# Patient Record
Sex: MALE | Race: WHITE | NOT HISPANIC OR LATINO | Employment: OTHER | URBAN - METROPOLITAN AREA
[De-identification: names, ages, dates, MRNs, and addresses within clinical notes are randomized per-mention and may not be internally consistent; named-entity substitution may affect disease eponyms.]

---

## 2017-02-08 ENCOUNTER — TELEPHONE (OUTPATIENT)
Dept: FAMILY MEDICINE | Facility: OTHER | Age: 62
End: 2017-02-08

## 2017-02-08 NOTE — TELEPHONE ENCOUNTER
Panel Management Review      Patient has the following on his problem list: None      Composite cancer screening  Chart review shows that this patient is due/due soon for the following Fecal Colorectal (FIT)  Summary:    Patient is due/failing the following:   Lipids, CMP, CBC, Uric acid, Hep C    Action needed:   Patient needs fasting lab only appointment    Type of outreach:    Phone, left message for patient to call back.  Please assist in scheduling for a fasting lab appointment only.    Questions for provider review:    None                                                                                                                                    Naina Honeycutt CMA       Chart routed to Care Team .

## 2017-02-15 NOTE — TELEPHONE ENCOUNTER
I spoke with patient and was informed that he has transferred care to [facility] instead because of an insurance change. as of 2/15/17.  Naina Honeycutt, CMA

## 2019-01-16 ENCOUNTER — TRANSFERRED RECORDS (OUTPATIENT)
Dept: HEALTH INFORMATION MANAGEMENT | Facility: CLINIC | Age: 64
End: 2019-01-16

## 2019-01-18 ENCOUNTER — TELEPHONE (OUTPATIENT)
Dept: FAMILY MEDICINE | Facility: OTHER | Age: 64
End: 2019-01-18

## 2019-01-18 NOTE — TELEPHONE ENCOUNTER
Reason for call:  Patient reporting a symptom    Symptom or request: back pain     Duration (how long have symptoms been present): seen two days ago    Have you been treated for this before? Yes    Additional comments: Patient was seen at [facility] two days ago and told to follow-up with Dr Delong but patient is wondering what he should do, he is unable to get up to come in.     Phone Number patient can be reached at:  Other phone number:  Spouse Nancie [phone]    Best Time:  asap    Can we leave a detailed message on this number:  YES    Call taken on 1/18/2019 at 8:32 AM by Yusra Tavares

## 2019-01-18 NOTE — TELEPHONE ENCOUNTER
Jaswant Clark is a 63 year old male-     NURSING ASSESSMENT:  Description:  Pt was seen in the ER on 1/16/2019 for lower back pain. Pt was advised to follow up in clinic in 2-3 days, however he calls today in severe pain and states that he is unable to get up and walk. Pt rates his pain a 11/10 when he tries to move around. Pt has been taking his Norco as prescribed as well as the Flexeril with some improvement in pain, but it doesn't last.   Onset/duration:  2 days   Precip. factors:  Unknown   Associated symptoms:  Lower back pain with some tingling in the lower leg.   Improves/worsens symptoms:  Not improving   Pain scale (0-10)   10/10  Last exam/Treatment:  7/5/2016  Allergies:   Allergies   Allergen Reactions     No Known Drug Allergies        MEDICATIONS:   Taking medication(s) as prescribed? Yes  Taking over the counter medication(s?) Yes  Any medication side effects? No significant side effects    Any barriers to taking medication(s) as prescribed?  No  Medication(s) improving/managing symptoms?  No  Medication reconciliation completed: No      NURSING PLAN: Nursing advice to patient see in the ER for treatment of severe pain    RECOMMENDED DISPOSITION:  To ED, another person to drive - Pt's wife is home with him. He will try to get in the car and have her bring him. If unable to walk, advised pt to call an ambulance.   Will comply with recommendation: Yes  If further questions/concerns or if symptoms do not improve, worsen or new symptoms develop, call your PCP or [facility] as soon as possible.      Guideline used: Back Pain   Telephone Triage Protocols for Nurses, Fifth Edition, Mere Johnson RN

## 2022-01-31 ENCOUNTER — APPOINTMENT (OUTPATIENT)
Dept: CT IMAGING | Facility: CLINIC | Age: 67
End: 2022-01-31
Attending: EMERGENCY MEDICINE
Payer: MEDICARE

## 2022-01-31 ENCOUNTER — HOSPITAL ENCOUNTER (EMERGENCY)
Facility: CLINIC | Age: 67
Discharge: HOME | End: 2022-01-31
Attending: EMERGENCY MEDICINE | Admitting: EMERGENCY MEDICINE
Payer: MEDICARE

## 2022-01-31 VITALS
TEMPERATURE: 99.7 F | WEIGHT: 203.6 LBS | DIASTOLIC BLOOD PRESSURE: 72 MMHG | OXYGEN SATURATION: 95 % | BODY MASS INDEX: 31.4 KG/M2 | HEART RATE: 99 BPM | SYSTOLIC BLOOD PRESSURE: 122 MMHG | RESPIRATION RATE: 20 BRPM

## 2022-01-31 DIAGNOSIS — J18.9 PNEUMONIA OF LEFT UPPER LOBE DUE TO INFECTIOUS ORGANISM: ICD-10-CM

## 2022-01-31 LAB
ANION GAP SERPL CALCULATED.3IONS-SCNC: 6 MMOL/L (ref 3–14)
BASOPHILS # BLD AUTO: 0 10E3/UL (ref 0–0.2)
BASOPHILS NFR BLD AUTO: 0 %
BUN SERPL-MCNC: 23 MG/DL (ref 7–30)
CALCIUM SERPL-MCNC: 8.2 MG/DL (ref 8.5–10.1)
CHLORIDE BLD-SCNC: 98 MMOL/L (ref 94–109)
CO2 SERPL-SCNC: 24 MMOL/L (ref 20–32)
CREAT SERPL-MCNC: 1.13 MG/DL (ref 0.66–1.25)
CRP SERPL-MCNC: 119 MG/L (ref 0–8)
D DIMER PPP FEU-MCNC: 4.31 UG/ML FEU (ref 0–0.5)
EOSINOPHIL # BLD AUTO: 0 10E3/UL (ref 0–0.7)
EOSINOPHIL NFR BLD AUTO: 0 %
ERYTHROCYTE [DISTWIDTH] IN BLOOD BY AUTOMATED COUNT: 13.2 % (ref 10–15)
FLUAV RNA SPEC QL NAA+PROBE: NEGATIVE
FLUBV RNA RESP QL NAA+PROBE: NEGATIVE
GFR SERPL CREATININE-BSD FRML MDRD: 71 ML/MIN/1.73M2
GLUCOSE BLD-MCNC: 135 MG/DL (ref 70–99)
HCT VFR BLD AUTO: 35.8 % (ref 40–53)
HGB BLD-MCNC: 12.5 G/DL (ref 13.3–17.7)
IMM GRANULOCYTES # BLD: 0.2 10E3/UL
IMM GRANULOCYTES NFR BLD: 1 %
LACTATE SERPL-SCNC: 1.4 MMOL/L (ref 0.7–2)
LYMPHOCYTES # BLD AUTO: 0.7 10E3/UL (ref 0.8–5.3)
LYMPHOCYTES NFR BLD AUTO: 4 %
MCH RBC QN AUTO: 30 PG (ref 26.5–33)
MCHC RBC AUTO-ENTMCNC: 34.9 G/DL (ref 31.5–36.5)
MCV RBC AUTO: 86 FL (ref 78–100)
MONOCYTES # BLD AUTO: 1.1 10E3/UL (ref 0–1.3)
MONOCYTES NFR BLD AUTO: 7 %
NEUTROPHILS # BLD AUTO: 13.8 10E3/UL (ref 1.6–8.3)
NEUTROPHILS NFR BLD AUTO: 88 %
NRBC # BLD AUTO: 0 10E3/UL
NRBC BLD AUTO-RTO: 0 /100
PLATELET # BLD AUTO: 93 10E3/UL (ref 150–450)
POTASSIUM BLD-SCNC: 3.5 MMOL/L (ref 3.4–5.3)
RBC # BLD AUTO: 4.16 10E6/UL (ref 4.4–5.9)
SARS-COV-2 RNA RESP QL NAA+PROBE: NEGATIVE
SODIUM SERPL-SCNC: 128 MMOL/L (ref 133–144)
TROPONIN I SERPL HS-MCNC: 14 NG/L
WBC # BLD AUTO: 15.7 10E3/UL (ref 4–11)

## 2022-01-31 PROCEDURE — 71275 CT ANGIOGRAPHY CHEST: CPT

## 2022-01-31 PROCEDURE — 99285 EMERGENCY DEPT VISIT HI MDM: CPT | Mod: 25 | Performed by: EMERGENCY MEDICINE

## 2022-01-31 PROCEDURE — 82310 ASSAY OF CALCIUM: CPT | Performed by: EMERGENCY MEDICINE

## 2022-01-31 PROCEDURE — 250N000011 HC RX IP 250 OP 636: Performed by: EMERGENCY MEDICINE

## 2022-01-31 PROCEDURE — 96374 THER/PROPH/DIAG INJ IV PUSH: CPT | Mod: 59 | Performed by: EMERGENCY MEDICINE

## 2022-01-31 PROCEDURE — 258N000003 HC RX IP 258 OP 636: Performed by: EMERGENCY MEDICINE

## 2022-01-31 PROCEDURE — 85379 FIBRIN DEGRADATION QUANT: CPT | Performed by: EMERGENCY MEDICINE

## 2022-01-31 PROCEDURE — 93010 ELECTROCARDIOGRAM REPORT: CPT | Performed by: EMERGENCY MEDICINE

## 2022-01-31 PROCEDURE — C9803 HOPD COVID-19 SPEC COLLECT: HCPCS | Performed by: EMERGENCY MEDICINE

## 2022-01-31 PROCEDURE — 85025 COMPLETE CBC W/AUTO DIFF WBC: CPT | Performed by: EMERGENCY MEDICINE

## 2022-01-31 PROCEDURE — 250N000009 HC RX 250: Performed by: EMERGENCY MEDICINE

## 2022-01-31 PROCEDURE — 96361 HYDRATE IV INFUSION ADD-ON: CPT | Performed by: EMERGENCY MEDICINE

## 2022-01-31 PROCEDURE — 84484 ASSAY OF TROPONIN QUANT: CPT | Performed by: EMERGENCY MEDICINE

## 2022-01-31 PROCEDURE — 93005 ELECTROCARDIOGRAM TRACING: CPT | Performed by: EMERGENCY MEDICINE

## 2022-01-31 PROCEDURE — 83605 ASSAY OF LACTIC ACID: CPT | Performed by: EMERGENCY MEDICINE

## 2022-01-31 PROCEDURE — 36415 COLL VENOUS BLD VENIPUNCTURE: CPT | Performed by: EMERGENCY MEDICINE

## 2022-01-31 PROCEDURE — 87636 SARSCOV2 & INF A&B AMP PRB: CPT | Performed by: EMERGENCY MEDICINE

## 2022-01-31 PROCEDURE — 87040 BLOOD CULTURE FOR BACTERIA: CPT | Performed by: EMERGENCY MEDICINE

## 2022-01-31 PROCEDURE — 86140 C-REACTIVE PROTEIN: CPT | Performed by: EMERGENCY MEDICINE

## 2022-01-31 RX ORDER — ONDANSETRON 2 MG/ML
4 INJECTION INTRAMUSCULAR; INTRAVENOUS EVERY 30 MIN PRN
Status: DISCONTINUED | OUTPATIENT
Start: 2022-01-31 | End: 2022-01-31 | Stop reason: HOSPADM

## 2022-01-31 RX ORDER — AZITHROMYCIN 250 MG/1
TABLET, FILM COATED ORAL
Qty: 6 TABLET | Refills: 0 | Status: SHIPPED | OUTPATIENT
Start: 2022-01-31 | End: 2022-02-05

## 2022-01-31 RX ORDER — BENZONATATE 200 MG/1
200 CAPSULE ORAL 3 TIMES DAILY PRN
Qty: 20 CAPSULE | Refills: 0 | Status: SHIPPED | OUTPATIENT
Start: 2022-01-31

## 2022-01-31 RX ORDER — SODIUM CHLORIDE, SODIUM LACTATE, POTASSIUM CHLORIDE, CALCIUM CHLORIDE 600; 310; 30; 20 MG/100ML; MG/100ML; MG/100ML; MG/100ML
125 INJECTION, SOLUTION INTRAVENOUS CONTINUOUS
Status: DISCONTINUED | OUTPATIENT
Start: 2022-01-31 | End: 2022-01-31 | Stop reason: HOSPADM

## 2022-01-31 RX ORDER — KETOROLAC TROMETHAMINE 15 MG/ML
15 INJECTION, SOLUTION INTRAMUSCULAR; INTRAVENOUS ONCE
Status: COMPLETED | OUTPATIENT
Start: 2022-01-31 | End: 2022-01-31

## 2022-01-31 RX ORDER — IOPAMIDOL 755 MG/ML
500 INJECTION, SOLUTION INTRAVASCULAR ONCE
Status: COMPLETED | OUTPATIENT
Start: 2022-01-31 | End: 2022-01-31

## 2022-01-31 RX ORDER — PANTOPRAZOLE SODIUM 40 MG/1
80 TABLET, DELAYED RELEASE ORAL DAILY
COMMUNITY
Start: 2022-01-13

## 2022-01-31 RX ORDER — LEVOFLOXACIN 500 MG/1
500 TABLET, FILM COATED ORAL DAILY
Qty: 7 TABLET | Refills: 0 | Status: SHIPPED | OUTPATIENT
Start: 2022-01-31 | End: 2022-02-07

## 2022-01-31 RX ADMIN — IOPAMIDOL 75 ML: 755 INJECTION, SOLUTION INTRAVENOUS at 12:37

## 2022-01-31 RX ADMIN — KETOROLAC TROMETHAMINE 15 MG: 15 INJECTION, SOLUTION INTRAMUSCULAR; INTRAVENOUS at 12:08

## 2022-01-31 RX ADMIN — SODIUM CHLORIDE 70 ML: 9 INJECTION, SOLUTION INTRAVENOUS at 12:37

## 2022-01-31 RX ADMIN — SODIUM CHLORIDE, POTASSIUM CHLORIDE, SODIUM LACTATE AND CALCIUM CHLORIDE 1000 ML: 600; 310; 30; 20 INJECTION, SOLUTION INTRAVENOUS at 12:13

## 2022-01-31 NOTE — DISCHARGE INSTRUCTIONS
Your Covid and influenza swabs were negative.  Your lab work showed an elevated white blood cell count which indicates bacterial infection, and CT scan showed left-sided pneumonia    Prescriptions for 2 different antibiotics were sent to your pharmacy.  You should start taking them both today.  Levaquin is taken once daily for 7 days.  Azithromycin is taken 2 tabs on the first day then 1 tab daily for the remaining 4 days, total of 5 days of azithromycin    Prescription for Tessalon Perles was sent to the pharmacy to help with your cough.  Can take up to 3 times daily as needed    Should drink plenty of fluids and get plenty of rest.  You can take Tylenol or ibuprofen per bottle instructions as needed for aches, pains, or fever    Follow-up with your primary provider within 1 week to make sure that everything is improving.  If you have any new or worsening symptoms, worsening chest pain or shortness of breath, are not getting better after 48 hours of antibiotics, or have any new concerns return promptly to the emergency room for evaluation

## 2022-01-31 NOTE — Clinical Note
Jaswant Clark was seen and treated in our emergency department on 1/31/2022.    COVID-19 and influenza testing negative     Sincerely,     [facility] Emergency Dept

## 2022-01-31 NOTE — ED PROVIDER NOTES
History     Chief Complaint   Patient presents with     Covid Concern     HPI  Jaswant Clark is a 66 year old male who presents to the emergency room with fever, cough, and shortness of breath.  Symptoms started 4 days ago.  Cough is productive of purulent sputum, no blood.  Has been running a fever up to 105  F at home.  Noted that he had pain on the left side of his chest and abdomen especially if he laid on that side.  Has not had any swelling in his legs.  No anterior chest pain, nausea, vomiting.  No known sick contacts.  He did receive Covid vaccination but has not received a flu vaccine this season.    Allergies:  Allergies   Allergen Reactions     No Known Drug Allergies        Problem List:    Patient Active Problem List    Diagnosis Date Noted     Health Care Home 09/27/2011     Priority: High     x  DX V65.8 REPLACED WITH 32927 HEALTH CARE HOME (04/08/2013)       Erectile dysfunction, unspecified erectile dysfunction type 12/21/2015     Priority: Medium     Cellulitis, wound, post-operative 04/04/2015     Priority: Medium     Postconcussion syndrome 10/28/2011     Priority: Medium     Advanced directives, counseling/discussion 10/03/2011     Priority: Medium     Health care directive given to patient. Recommended that he makes a copy for his medical record.        Hyperlipidemia LDL goal <160 10/31/2010     Priority: Medium     Family history of other cardiovascular diseases 12/29/2005     Priority: Medium     Problem list name updated by automated process. Provider to review and confirm  Problem list name updated by automated process. Provider to review       Esophageal reflux 02/03/2005     Priority: Medium     Pena's esophagus 02/03/2005     Priority: Medium     Other and unspecified alcohol dependence, unspecified drinking behavior 02/03/2005     Priority: Medium     Gout 02/03/2005     Priority: Medium     Problem list name updated by automated process. Provider to review       Hemorrhage of  gastrointestinal tract 02/03/2005     Priority: Medium     Problem list name updated by automated process. Provider to review          Past Medical History:    Past Medical History:   Diagnosis Date     Pena's esophagus      Esophageal reflux      Gout, unspecified      Hemorrhage of gastrointestinal tract, unspecified 1/10/2005       Past Surgical History:    Past Surgical History:   Procedure Laterality Date      UGI ENDOSCOPY DIAG W BIOPSY  01/11/2005     NO HISTORY OF SURGERY       ORTHOPEDIC SURGERY      on hand       Family History:    Family History   Problem Relation Age of Onset     Cancer Mother      C.A.D. Brother         MI in mid 50's     Cardiovascular Brother         heart attack     Heart Disease Brother         heart attack     Breast Cancer Sister      Cancer Sister         breast cancer     Breast Cancer Sister      Cancer Sister         breast caner     Breast Cancer Sister      Cancer Sister         breast cancer     Asthma No family hx of      Diabetes No family hx of      Hypertension No family hx of      Cerebrovascular Disease No family hx of      Cancer - colorectal No family hx of      Prostate Cancer No family hx of      Alzheimer Disease No family hx of      Arthritis No family hx of      Blood Disease No family hx of      Circulatory No family hx of      Eye Disorder No family hx of      Gastrointestinal Disease No family hx of      Genitourinary Problems No family hx of      Lipids No family hx of      Musculoskeletal Disorder No family hx of      Neurologic Disorder No family hx of      Respiratory No family hx of      Thyroid Disease No family hx of        Social History:  Marital Status:  Single [1]  Social History     Tobacco Use     Smoking status: Never Smoker     Smokeless tobacco: Former User   Substance Use Topics     Alcohol use: Not Currently     Comment: 3 beers per night and more on weekends     Drug use: No        Medications:    allopurinol (ZYLOPRIM) 300 MG  tablet  azithromycin (ZITHROMAX) 250 MG tablet  benzonatate (TESSALON) 200 MG capsule  HYDROcodone-acetaminophen (NORCO) 5-325 MG per tablet  levofloxacin (LEVAQUIN) 500 MG tablet  pantoprazole (PROTONIX) 40 MG EC tablet  simvastatin (ZOCOR) 20 MG tablet  terazosin (HYTRIN) 1 MG capsule          Review of Systems   All other systems reviewed and are negative.      Physical Exam   BP: 137/70  Pulse: (!) 123  Temp: 99.7  F (37.6  C)  Resp: 18  Weight: 92.4 kg (203 lb 9.6 oz)  SpO2: 96 %      Physical Exam  Vitals and nursing note reviewed.   Constitutional:       General: He is not in acute distress.     Appearance: He is not diaphoretic.   HENT:      Head: Atraumatic.   Eyes:      General: No scleral icterus.     Pupils: Pupils are equal, round, and reactive to light.   Cardiovascular:      Rate and Rhythm: Tachycardia present.      Heart sounds: Normal heart sounds.   Pulmonary:      Effort: Pulmonary effort is normal. No respiratory distress.      Breath sounds: Decreased air movement present. No wheezing.   Chest:      Chest wall: No tenderness.   Abdominal:      General: Bowel sounds are normal.      Palpations: Abdomen is soft.      Tenderness: There is no abdominal tenderness.   Musculoskeletal:         General: No tenderness.      Right lower leg: No edema.      Left lower leg: No edema.   Skin:     General: Skin is warm.      Findings: No rash.   Neurological:      Mental Status: He is alert.         ED Course                 Procedures              EKG Interpretation:      Interpreted by Carrol Gómez DO  Time reviewed: 1105  Symptoms at time of EKG: Cough, shortness of breath  Rhythm: sinus tachycardia  Rate: Tachycardia and 116 bpm  Axis: Normal  Ectopy: none  Conduction: normal  ST Segments/ T Waves: No acute ischemic changes  Q Waves: none  Comparison to prior: No old EKG available    Clinical Impression: sinus tachycardia                Critical Care time:  none               Results for orders  placed or performed during the hospital encounter of 01/31/22 (from the past 24 hour(s))   CBC with platelets differential    Narrative    The following orders were created for panel order CBC with platelets differential.  Procedure                               Abnormality         Status                     ---------                               -----------         ------                     CBC with platelets and d...[[id number]]  Abnormal            Final result                 Please view results for these tests on the individual orders.   D dimer quantitative   Result Value Ref Range    D-Dimer Quantitative 4.31 (H) 0.00 - 0.50 ug/mL FEU    Narrative    This D-dimer assay is intended for use in conjunction with a clinical pretest probability assessment model to exclude pulmonary embolism (PE) and deep venous thrombosis (DVT) in outpatients suspected of PE or DVT. The cut-off value is 0.50 ug/mL FEU.   Basic metabolic panel   Result Value Ref Range    Sodium 128 (L) 133 - 144 mmol/L    Potassium 3.5 3.4 - 5.3 mmol/L    Chloride 98 94 - 109 mmol/L    Carbon Dioxide (CO2) 24 20 - 32 mmol/L    Anion Gap 6 3 - 14 mmol/L    Urea Nitrogen 23 7 - 30 mg/dL    Creatinine 1.13 0.66 - 1.25 mg/dL    Calcium 8.2 (L) 8.5 - 10.1 mg/dL    Glucose 135 (H) 70 - 99 mg/dL    GFR Estimate 71 >60 mL/min/1.73m2   Troponin I   Result Value Ref Range    Troponin I High Sensitivity 14 <79 ng/L   CRP inflammation   Result Value Ref Range    CRP Inflammation 119.0 (H) 0.0 - 8.0 mg/L   Lactic acid whole blood   Result Value Ref Range    Lactic Acid 1.4 0.7 - 2.0 mmol/L   CBC with platelets and differential   Result Value Ref Range    WBC Count 15.7 (H) 4.0 - 11.0 10e3/uL    RBC Count 4.16 (L) 4.40 - 5.90 10e6/uL    Hemoglobin 12.5 (L) 13.3 - 17.7 g/dL    Hematocrit 35.8 (L) 40.0 - 53.0 %    MCV 86 78 - 100 fL    MCH 30.0 26.5 - 33.0 pg    MCHC 34.9 31.5 - 36.5 g/dL    RDW 13.2 10.0 - 15.0 %    Platelet Count 93 (L) 150 - 450 10e3/uL    %  Neutrophils 88 %    % Lymphocytes 4 %    % Monocytes 7 %    % Eosinophils 0 %    % Basophils 0 %    % Immature Granulocytes 1 %    NRBCs per 100 WBC 0 <1 /100    Absolute Neutrophils 13.8 (H) 1.6 - 8.3 10e3/uL    Absolute Lymphocytes 0.7 (L) 0.8 - 5.3 10e3/uL    Absolute Monocytes 1.1 0.0 - 1.3 10e3/uL    Absolute Eosinophils 0.0 0.0 - 0.7 10e3/uL    Absolute Basophils 0.0 0.0 - 0.2 10e3/uL    Absolute Immature Granulocytes 0.2 <=0.4 10e3/uL    Absolute NRBCs 0.0 10e3/uL   Symptomatic; Unknown Influenza A/B & SARS-CoV2 (COVID-19) Virus PCR Multiplex Nose    Specimen: Nose; Swab   Result Value Ref Range    Influenza A PCR Negative Negative    Influenza B PCR Negative Negative    SARS CoV2 PCR Negative Negative    Narrative    Testing was performed using the brian SARS-CoV-2 & Influenza A/B Assay on the brian [employer] System. This test should be ordered for the detection of SARS-CoV-2 and influenza viruses in individuals who meet clinical and/or epidemiological criteria. Test performance is unknown in asymptomatic patients. This test is for in vitro diagnostic use under the FDA EUA for laboratories certified under CLIA to perform moderate and/or high complexity testing. This test has not been FDA cleared or approved. A negative result does not rule out the presence of PCR inhibitors in the specimen or target RNA in concentration below the limit of detection for the assay. If only one viral target is positive but coinfection with multiple targets is suspected, the sample should be re-tested with another FDA cleared, approved or authorized test, if coinfection would change clinical management. [facility] are certified under the Clinical Laboratory Improvement Amendments of 1988 (CLIA-88) as  qualified to perform moderate and/or high complexity laboratory testing.   CT Chest Pulmonary Embolism w Contrast    Narrative    CT CHEST PULMONARY EMBOLISM W CONTRAST 1/31/2022 12:57 PM    CLINICAL HISTORY:  Shortness of breath.  TECHNIQUE: CT angiogram chest during arterial phase injection IV  contrast. 2D and 3D MIP reconstructions were performed by the CT  technologist. Dose reduction techniques were used.     CONTRAST: 75mL, Isovue-370    COMPARISON: 6/27/2016    FINDINGS:  ANGIOGRAM CHEST: Pulmonary arteries are normal caliber and negative  for pulmonary emboli. Thoracic aorta is negative for dissection. No CT  evidence of right heart strain.    LUNGS AND PLEURA: There is focal dense airspace consolidation in the  posterior left upper lobe. Trace right pleural effusion.    MEDIASTINUM/AXILLAE: No evidence of pericardial effusion or  lymphadenopathy. Small hiatal hernia.    UPPER ABDOMEN: Normal.    MUSCULOSKELETAL: Multiple thoracic spine compression fractures.      Impression    IMPRESSION:  1.  No evidence of pulmonary embolism.  2.  Focal area of dense posterior left upper lobe pneumonitis.  3.  Trace right pleural effusion.    DIEGO JEFFERY MD         SYSTEM ID:  [id number]       Medications   lactated ringers BOLUS 1,000 mL (0 mLs Intravenous Stopped 1/31/22 1446)   ketorolac (TORADOL) injection 15 mg (15 mg Intravenous Given 1/31/22 1208)   iopamidol (ISOVUE-370) solution 500 mL (75 mLs Intravenous Given 1/31/22 1237)   Saline Bag 100mL  CT  flush use only (70 mLs Intravenous Given 1/31/22 1237)       Assessments & Plan (with Medical Decision Making)  Jaswant is a 66-year-old male who presents to the emergency room with cough, fever, shortness of breath starting 4 days ago.  See history and focused physical exam as above  Pleasant 66-year-old male in no acute respiratory distress, vital signs are stable other than pulse of 123, is afebrile but did take ibuprofen before coming to the emergency room.  Lung sounds are clear bilaterally, no wheezing, rales, rhonchi.  Does not have any lower extremity edema.  Does not have any tenderness over his chest.  EKG that was obtained showed sinus tachycardia without any  acute ST changes or ectopy.  Will get lab work, swab for Covid and influenza, and also get a CT scan of his chest due to concern for possible PE.  He is agreeable to this plan will also be given fluids and Toradol  Labs and imaging as above.  He does have an elevated white blood cell count, elevated D-dimer, and elevated CRP.  CT scan shows sign of pneumonia.  Covid and influenza swabs were negative.  Heart rate improved with Toradol and fluids.  Is not requiring any supplemental oxygen.  Discussed treatment with the patient, will start him on antibiotics.  Also given a prescription for Tessalon Perles to help with his cough.  Recommended other supportive care at home, and should follow-up closely with his primary provider.  Advised to complete course of antibiotics even if he begins to feel better.  He should return promptly to the ER if he has any worsening symptoms.  He is agreeable to this plan and discharged in no acute distress.     I have reviewed the nursing notes.    I have reviewed the findings, diagnosis, plan and need for follow up with the patient.       Discharge Medication List as of 1/31/2022  2:46 PM      START taking these medications    Details   azithromycin (ZITHROMAX) 250 MG tablet Take 2 tablets (500 mg) by mouth daily for 1 day, THEN 1 tablet (250 mg) daily for 4 days., Disp-6 tablet, R-0, E-Prescribe      benzonatate (TESSALON) 200 MG capsule Take 1 capsule (200 mg) by mouth 3 times daily as needed for cough, Disp-20 capsule, R-0, E-Prescribe      levofloxacin (LEVAQUIN) 500 MG tablet Take 1 tablet (500 mg) by mouth daily for 7 days, Disp-7 tablet, R-0, E-Prescribe             Final diagnoses:   Pneumonia of left upper lobe due to infectious organism       1/31/2022   [facility] EMERGENCY DEPT     Carrol Gómez,   01/31/22 8073

## 2022-01-31 NOTE — ED TRIAGE NOTES
Pt reports starting with covid symptoms on Friday, running really high fevers and cough with some shortness of breath

## 2022-02-05 LAB — BACTERIA BLD CULT: NO GROWTH

## 2022-11-28 ENCOUNTER — HOSPITAL ENCOUNTER (EMERGENCY)
Facility: CLINIC | Age: 67
Discharge: HOME | End: 2022-11-28
Attending: EMERGENCY MEDICINE | Admitting: EMERGENCY MEDICINE
Payer: MEDICARE

## 2022-11-28 ENCOUNTER — APPOINTMENT (OUTPATIENT)
Dept: GENERAL RADIOLOGY | Facility: CLINIC | Age: 67
End: 2022-11-28
Attending: EMERGENCY MEDICINE
Payer: MEDICARE

## 2022-11-28 VITALS
RESPIRATION RATE: 16 BRPM | SYSTOLIC BLOOD PRESSURE: 135 MMHG | TEMPERATURE: 98.8 F | HEART RATE: 90 BPM | HEIGHT: 68 IN | WEIGHT: 197 LBS | BODY MASS INDEX: 29.86 KG/M2 | OXYGEN SATURATION: 97 % | DIASTOLIC BLOOD PRESSURE: 83 MMHG

## 2022-11-28 DIAGNOSIS — J21.0 RSV BRONCHIOLITIS: ICD-10-CM

## 2022-11-28 LAB
FLUAV RNA SPEC QL NAA+PROBE: NEGATIVE
FLUBV RNA RESP QL NAA+PROBE: NEGATIVE
RSV RNA SPEC NAA+PROBE: POSITIVE
SARS-COV-2 RNA RESP QL NAA+PROBE: NEGATIVE

## 2022-11-28 PROCEDURE — C9803 HOPD COVID-19 SPEC COLLECT: HCPCS | Performed by: EMERGENCY MEDICINE

## 2022-11-28 PROCEDURE — 99283 EMERGENCY DEPT VISIT LOW MDM: CPT | Mod: CS | Performed by: EMERGENCY MEDICINE

## 2022-11-28 PROCEDURE — 71045 X-RAY EXAM CHEST 1 VIEW: CPT

## 2022-11-28 PROCEDURE — 87637 SARSCOV2&INF A&B&RSV AMP PRB: CPT | Performed by: EMERGENCY MEDICINE

## 2022-11-28 PROCEDURE — 99284 EMERGENCY DEPT VISIT MOD MDM: CPT | Mod: CS,25 | Performed by: EMERGENCY MEDICINE

## 2022-11-28 ASSESSMENT — ENCOUNTER SYMPTOMS
FATIGUE: 1
FEVER: 1
SHORTNESS OF BREATH: 1
ACTIVITY CHANGE: 1
COUGH: 1
CHILLS: 1
APPETITE CHANGE: 1

## 2022-11-28 ASSESSMENT — ACTIVITIES OF DAILY LIVING (ADL): ADLS_ACUITY_SCORE: 35

## 2022-11-28 NOTE — ED TRIAGE NOTES
Reports cough, SOB and TMAX 103.8. Symptom onset Wednesday night.     Triage Assessment     Row Name 11/28/22 1116       Triage Assessment (Adult)    Airway WDL WDL       Respiratory WDL    Respiratory WDL X;cough;rhythm/pattern    Rhythm/Pattern, Respiratory shortness of breath       Skin Circulation/Temperature WDL    Skin Circulation/Temperature WDL WDL       Cardiac WDL    Cardiac WDL WDL       Peripheral/Neurovascular WDL    Peripheral Neurovascular WDL WDL       Cognitive/Neuro/Behavioral WDL    Cognitive/Neuro/Behavioral WDL WDL

## 2022-11-28 NOTE — DISCHARGE INSTRUCTIONS
Testing confirmed positive for respiratory syncytial virus(RSV)  This is a viral bronchiolitis causes chest congestion.  Your chest x-ray is clear at this time shows no pneumonia.  RSV can cause significant respiratory distress in infants and young children so I advised her to stay away from them over the next week.  Cover your cough.  Treat fever greater than 100.4 Fahrenheit and stay well-hydrated.

## 2022-11-28 NOTE — ED PROVIDER NOTES
History     Chief Complaint   Patient presents with     Shortness of Breath     Fever     HPI  Jaswant Clark is a 67 year old male who presents with shortness of breath, fever, chills.  Loose cough.  Symptom onset Wednesday.  Non-smoker.  Previous history for pneumonia.  T-max 103.6.  Wife developed similar symptoms starting Thursday.  Patient has not had either influenza vaccine or COVID booster.  Screening for COVID at home was negative prior to Thanksgiving.    Allergies:  Allergies   Allergen Reactions     No Known Drug Allergies        Problem List:    Patient Active Problem List    Diagnosis Date Noted     Health Care Home 09/27/2011     Priority: High     x  DX V65.8 REPLACED WITH 61325 [facility] C[facility] (04/08/2013)       Erectile dysfunction, unspecified erectile dysfunction type 12/21/2015     Priority: Medium     Cellulitis, wound, post-operative 04/04/2015     Priority: Medium     Postconcussion syndrome 10/28/2011     Priority: Medium     Advanced directives, counseling/discussion 10/03/2011     Priority: Medium     Health care directive given to patient. Recommended that he makes a copy for his medical record.        Hyperlipidemia LDL goal <160 10/31/2010     Priority: Medium     Family history of other cardiovascular diseases 12/29/2005     Priority: Medium     Problem list name updated by automated process. Provider to review and confirm  Problem list name updated by automated process. Provider to review       Esophageal reflux 02/03/2005     Priority: Medium     Pena's esophagus 02/03/2005     Priority: Medium     Other and unspecified alcohol dependence, unspecified drinking behavior 02/03/2005     Priority: Medium     Gout 02/03/2005     Priority: Medium     Problem list name updated by automated process. Provider to review       Hemorrhage of gastrointestinal tract 02/03/2005     Priority: Medium     Problem list name updated by automated process. Provider to review          Past Medical  History:    Past Medical History:   Diagnosis Date     Pena's esophagus      Esophageal reflux      Gout, unspecified      Hemorrhage of gastrointestinal tract, unspecified 1/10/2005       Past Surgical History:    Past Surgical History:   Procedure Laterality Date     HC UGI ENDOSCOPY DIAG W BIOPSY  01/11/2005     NO HISTORY OF SURGERY       ORTHOPEDIC SURGERY      on hand       Family History:    Family History   Problem Relation Age of Onset     Cancer Mother      C.A.D. Brother         MI in mid 50's     Cardiovascular Brother         heart attack     Heart Disease Brother         heart attack     Breast Cancer Sister      Cancer Sister         breast cancer     Breast Cancer Sister      Cancer Sister         breast caner     Breast Cancer Sister      Cancer Sister         breast cancer     Asthma No family hx of      Diabetes No family hx of      Hypertension No family hx of      Cerebrovascular Disease No family hx of      Cancer - colorectal No family hx of      Prostate Cancer No family hx of      Alzheimer Disease No family hx of      Arthritis No family hx of      Blood Disease No family hx of      Circulatory No family hx of      Eye Disorder No family hx of      Gastrointestinal Disease No family hx of      Genitourinary Problems No family hx of      Lipids No family hx of      Musculoskeletal Disorder No family hx of      Neurologic Disorder No family hx of      Respiratory No family hx of      Thyroid Disease No family hx of        Social History:  Marital Status:   [2]  Social History     Tobacco Use     Smoking status: Never     Smokeless tobacco: Former   Substance Use Topics     Alcohol use: Not Currently     Comment: 3 beers per night and more on weekends     Drug use: No        Medications:    allopurinol (ZYLOPRIM) 300 MG tablet  benzonatate (TESSALON) 200 MG capsule  HYDROcodone-acetaminophen (NORCO) 5-325 MG per tablet  pantoprazole (PROTONIX) 40 MG EC tablet  simvastatin (ZOCOR) 20  "MG tablet  terazosin (HYTRIN) 1 MG capsule          Review of Systems   Constitutional: Positive for activity change, appetite change, chills, fatigue and fever.   HENT: Positive for congestion.    Respiratory: Positive for cough and shortness of breath.    All other systems reviewed and are negative.      Physical Exam   BP: (!) 153/87  Pulse: 90  Temp: 98.8  F (37.1  C)  Resp: 18  Height: 172.7 cm (5' 8\")  Weight: 89.4 kg (197 lb)  SpO2: 94 %      Physical Exam  Vitals and nursing note reviewed.   Constitutional:       Appearance: He is not ill-appearing.   HENT:      Head: Normocephalic.      Nose: Nose normal.   Eyes:      Conjunctiva/sclera: Conjunctivae normal.   Cardiovascular:      Rate and Rhythm: Normal rate.   Pulmonary:      Effort: Pulmonary effort is normal.      Breath sounds: Rhonchi present. No wheezing or rales.   Abdominal:      Palpations: Abdomen is soft.   Skin:     General: Skin is warm.      Capillary Refill: Capillary refill takes less than 2 seconds.      Findings: No rash.   Neurological:      General: No focal deficit present.      Mental Status: He is alert and oriented to person, place, and time.   Psychiatric:         Mood and Affect: Mood normal.         Behavior: Behavior normal.         ED Course                 Procedures                  Results for orders placed or performed during the hospital encounter of 11/28/22 (from the past 24 hour(s))   Symptomatic; Yes; 11/23/2022 Influenza A/B & SARS-CoV2 (COVID-19) Virus PCR Multiplex Nasopharyngeal    Specimen: Nasopharyngeal; Swab   Result Value Ref Range    Influenza A PCR Negative Negative    Influenza B PCR Negative Negative    RSV PCR Positive (A) Negative    SARS CoV2 PCR Negative Negative    Narrative    Testing was performed using the Xpert Xpress CoV2/Flu/RSV Assay on the [employer] GeneXpert Instrument. This test should be ordered for the detection of SARS-CoV-2 and influenza viruses in individuals who meet clinical and/or " epidemiological criteria. Test performance is unknown in asymptomatic patients. This test is for in vitro diagnostic use under the FDA EUA for laboratories certified under CLIA to perform high or moderate complexity testing. This test has not been FDA cleared or approved. A negative result does not rule out the presence of PCR inhibitors in the specimen or target RNA in concentration below the limit of detection for the assay. If only one viral target is positive but coinfection with multiple targets is suspected, the sample should be re-tested with another FDA cleared, approved, or authorized test, if coinfection would change clinical management. This test was validated by the [facility] [employer]. These laboratories are certified under the Clinical Laboratory Improvement Amendments of 1988 (CLIA-88) as qualified to perform high complexity laboratory testing.   XR Chest Port 1 View    Narrative    XR CHEST PORT 1 VIEW 11/28/2022 12:18 PM    HISTORY: Fever with cough.  Bibasilar crackles.    COMPARISON: 1/31/2022    FINDINGS: Normal cardiac silhouette and pulmonary vasculature. No  evidence of consolidation or pleural effusions. Old left rib fracture.    DIEGO JEFFERY MD         SYSTEM ID:  [id number]       Medications - No data to display    Assessments & Plan (with Medical Decision Making)  Fever, chills, cough congestion over the last 5 days.  Vital signs reviewed noted be mildly hypertensive.  O2 sats 94% on room air not tachypneic.  Had a fair amount of nasal sinus congestion but was showing clear rhinorrhea.  His lungs revealed a few crackles bilateral lung bases.  Chest x-ray showed no infiltrate.  COVID screening negative.  Influenza A and B-.  RSV positive.     I have reviewed the nursing notes.    I have reviewed the findings, diagnosis, plan and need for follow up with the patient.      New Prescriptions    No medications on file       Final diagnoses:   RSV bronchiolitis       11/28/2022     [facility] EMERGENCY DEPT     Jak, Neo Calloway,   11/28/22 0267

## 2023-02-10 ENCOUNTER — APPOINTMENT (OUTPATIENT)
Dept: GENERAL RADIOLOGY | Facility: CLINIC | Age: 68
End: 2023-02-10
Attending: EMERGENCY MEDICINE
Payer: MEDICARE

## 2023-02-10 ENCOUNTER — HOSPITAL ENCOUNTER (EMERGENCY)
Facility: CLINIC | Age: 68
Discharge: HOME | End: 2023-02-10
Attending: EMERGENCY MEDICINE | Admitting: EMERGENCY MEDICINE
Payer: MEDICARE

## 2023-02-10 VITALS
RESPIRATION RATE: 22 BRPM | WEIGHT: 212 LBS | SYSTOLIC BLOOD PRESSURE: 148 MMHG | DIASTOLIC BLOOD PRESSURE: 82 MMHG | BODY MASS INDEX: 32.23 KG/M2 | HEART RATE: 75 BPM | TEMPERATURE: 98.3 F | OXYGEN SATURATION: 97 %

## 2023-02-10 DIAGNOSIS — R07.89 ATYPICAL CHEST PAIN: ICD-10-CM

## 2023-02-10 LAB
ALBUMIN UR-MCNC: NEGATIVE MG/DL
ANION GAP SERPL CALCULATED.3IONS-SCNC: 8 MMOL/L (ref 7–15)
APPEARANCE UR: CLEAR
BASOPHILS # BLD AUTO: 0 10E3/UL (ref 0–0.2)
BASOPHILS NFR BLD AUTO: 1 %
BILIRUB UR QL STRIP: NEGATIVE
BUN SERPL-MCNC: 14.8 MG/DL (ref 8–23)
CALCIUM SERPL-MCNC: 9.2 MG/DL (ref 8.8–10.2)
CHLORIDE SERPL-SCNC: 97 MMOL/L (ref 98–107)
COLOR UR AUTO: YELLOW
CREAT SERPL-MCNC: 1.16 MG/DL (ref 0.67–1.17)
DEPRECATED HCO3 PLAS-SCNC: 28 MMOL/L (ref 22–29)
EOSINOPHIL # BLD AUTO: 0 10E3/UL (ref 0–0.7)
EOSINOPHIL NFR BLD AUTO: 1 %
ERYTHROCYTE [DISTWIDTH] IN BLOOD BY AUTOMATED COUNT: 13.7 % (ref 10–15)
GFR SERPL CREATININE-BSD FRML MDRD: 69 ML/MIN/1.73M2
GLUCOSE SERPL-MCNC: 94 MG/DL (ref 70–99)
GLUCOSE UR STRIP-MCNC: NEGATIVE MG/DL
HCT VFR BLD AUTO: 42.4 % (ref 40–53)
HGB BLD-MCNC: 14.1 G/DL (ref 13.3–17.7)
HGB UR QL STRIP: NEGATIVE
IMM GRANULOCYTES # BLD: 0 10E3/UL
IMM GRANULOCYTES NFR BLD: 1 %
KETONES UR STRIP-MCNC: NEGATIVE MG/DL
LEUKOCYTE ESTERASE UR QL STRIP: NEGATIVE
LYMPHOCYTES # BLD AUTO: 2 10E3/UL (ref 0.8–5.3)
LYMPHOCYTES NFR BLD AUTO: 31 %
MCH RBC QN AUTO: 29 PG (ref 26.5–33)
MCHC RBC AUTO-ENTMCNC: 33.3 G/DL (ref 31.5–36.5)
MCV RBC AUTO: 87 FL (ref 78–100)
MONOCYTES # BLD AUTO: 0.7 10E3/UL (ref 0–1.3)
MONOCYTES NFR BLD AUTO: 11 %
MUCOUS THREADS #/AREA URNS LPF: PRESENT /LPF
NEUTROPHILS # BLD AUTO: 3.7 10E3/UL (ref 1.6–8.3)
NEUTROPHILS NFR BLD AUTO: 55 %
NITRATE UR QL: NEGATIVE
NRBC # BLD AUTO: 0 10E3/UL
NRBC BLD AUTO-RTO: 0 /100
PH UR STRIP: 6 [PH] (ref 5–7)
PLATELET # BLD AUTO: 139 10E3/UL (ref 150–450)
POTASSIUM SERPL-SCNC: 4.4 MMOL/L (ref 3.4–5.3)
RBC # BLD AUTO: 4.86 10E6/UL (ref 4.4–5.9)
RBC URINE: 1 /HPF
SODIUM SERPL-SCNC: 133 MMOL/L (ref 136–145)
SP GR UR STRIP: 1.01 (ref 1–1.03)
SQUAMOUS EPITHELIAL: <1 /HPF
TROPONIN T SERPL HS-MCNC: 23 NG/L
TROPONIN T SERPL HS-MCNC: 25 NG/L
UROBILINOGEN UR STRIP-MCNC: NORMAL MG/DL
WBC # BLD AUTO: 6.6 10E3/UL (ref 4–11)
WBC URINE: 1 /HPF

## 2023-02-10 PROCEDURE — 84484 ASSAY OF TROPONIN QUANT: CPT | Mod: 91 | Performed by: EMERGENCY MEDICINE

## 2023-02-10 PROCEDURE — 93010 ELECTROCARDIOGRAM REPORT: CPT | Performed by: EMERGENCY MEDICINE

## 2023-02-10 PROCEDURE — 81001 URINALYSIS AUTO W/SCOPE: CPT | Performed by: EMERGENCY MEDICINE

## 2023-02-10 PROCEDURE — 85025 COMPLETE CBC W/AUTO DIFF WBC: CPT | Performed by: EMERGENCY MEDICINE

## 2023-02-10 PROCEDURE — 80048 BASIC METABOLIC PNL TOTAL CA: CPT | Performed by: EMERGENCY MEDICINE

## 2023-02-10 PROCEDURE — 36415 COLL VENOUS BLD VENIPUNCTURE: CPT | Performed by: EMERGENCY MEDICINE

## 2023-02-10 PROCEDURE — 84484 ASSAY OF TROPONIN QUANT: CPT | Performed by: EMERGENCY MEDICINE

## 2023-02-10 PROCEDURE — 71046 X-RAY EXAM CHEST 2 VIEWS: CPT

## 2023-02-10 PROCEDURE — 99285 EMERGENCY DEPT VISIT HI MDM: CPT | Mod: 25

## 2023-02-10 PROCEDURE — 99284 EMERGENCY DEPT VISIT MOD MDM: CPT | Mod: 25 | Performed by: EMERGENCY MEDICINE

## 2023-02-10 PROCEDURE — 93005 ELECTROCARDIOGRAM TRACING: CPT

## 2023-02-10 ASSESSMENT — ACTIVITIES OF DAILY LIVING (ADL)
ADLS_ACUITY_SCORE: 35

## 2023-02-10 NOTE — ED PROVIDER NOTES
History     Chief Complaint   Patient presents with     Chest Pain     HPI  Jaswant Clark is a 67 year old male who presents to the emergency room for concern of chest pain.  Symptoms have been ongoing for the last 2 weeks.  Pain is over the left anterior chest and goes down to the bottom part of his ribs.  Occurs intermittently and lasts for variable amount of time.  Nothing seems to trigger it, but does notice that it happens more often if he is lying down.  Nothing makes it better.  He thought that he had shingles a few weeks ago, was told to take Tylenol and put calamine lotion on it, so he did.  Lesions have been improving.  No itching but still has a burning pain.  He said that he has never been evaluated for chest pain before.  Does take medication for high blood pressure and high cholesterol.  He does have family history of heart disease.  Is not a diabetic, does not smoke.    Allergies:  Allergies   Allergen Reactions     No Known Drug Allergies        Problem List:    Patient Active Problem List    Diagnosis Date Noted     Health Care Home 09/27/2011     Priority: High     x  DX V65.8 REPLACED WITH [facility] (04/08/2013)       Erectile dysfunction, unspecified erectile dysfunction type 12/21/2015     Priority: Medium     Cellulitis, wound, post-operative 04/04/2015     Priority: Medium     Postconcussion syndrome 10/28/2011     Priority: Medium     Advanced directives, counseling/discussion 10/03/2011     Priority: Medium     Health care directive given to patient. Recommended that he makes a copy for his medical record.        Hyperlipidemia LDL goal <160 10/31/2010     Priority: Medium     Family history of other cardiovascular diseases 12/29/2005     Priority: Medium     Problem list name updated by automated process. Provider to review and confirm  Problem list name updated by automated process. Provider to review       Esophageal reflux 02/03/2005     Priority: Medium     Pena's  esophagus 02/03/2005     Priority: Medium     Other and unspecified alcohol dependence, unspecified drinking behavior 02/03/2005     Priority: Medium     Gout 02/03/2005     Priority: Medium     Problem list name updated by automated process. Provider to review       Hemorrhage of gastrointestinal tract 02/03/2005     Priority: Medium     Problem list name updated by automated process. Provider to review          Past Medical History:    Past Medical History:   Diagnosis Date     Pena's esophagus      Esophageal reflux      Gout, unspecified      Hemorrhage of gastrointestinal tract, unspecified 1/10/2005       Past Surgical History:    Past Surgical History:   Procedure Laterality Date      UGI ENDOSCOPY DIAG W BIOPSY  01/11/2005     NO HISTORY OF SURGERY       ORTHOPEDIC SURGERY      on hand       Family History:    Family History   Problem Relation Age of Onset     Cancer Mother      C.A.D. Brother         MI in mid 50's     Cardiovascular Brother         heart attack     Heart Disease Brother         heart attack     Breast Cancer Sister      Cancer Sister         breast cancer     Breast Cancer Sister      Cancer Sister         breast caner     Breast Cancer Sister      Cancer Sister         breast cancer     Asthma No family hx of      Diabetes No family hx of      Hypertension No family hx of      Cerebrovascular Disease No family hx of      Cancer - colorectal No family hx of      Prostate Cancer No family hx of      Alzheimer Disease No family hx of      Arthritis No family hx of      Blood Disease No family hx of      Circulatory No family hx of      Eye Disorder No family hx of      Gastrointestinal Disease No family hx of      Genitourinary Problems No family hx of      Lipids No family hx of      Musculoskeletal Disorder No family hx of      Neurologic Disorder No family hx of      Respiratory No family hx of      Thyroid Disease No family hx of        Social History:  Marital Status:    [2]  Social History     Tobacco Use     Smoking status: Never     Smokeless tobacco: Former   Substance Use Topics     Alcohol use: Not Currently     Comment: 3 beers per night and more on weekends     Drug use: No        Medications:    allopurinol (ZYLOPRIM) 300 MG tablet  benzonatate (TESSALON) 200 MG capsule  HYDROcodone-acetaminophen (NORCO) 5-325 MG per tablet  pantoprazole (PROTONIX) 40 MG EC tablet  simvastatin (ZOCOR) 20 MG tablet  terazosin (HYTRIN) 1 MG capsule          Review of Systems   All other systems reviewed and are negative.      Physical Exam   BP: (!) 157/90  Pulse: 77  Temp: 98.3  F (36.8  C)  Resp: 16  Weight: 96.2 kg (212 lb)  SpO2: 98 %      Physical Exam  Vitals and nursing note reviewed.   Constitutional:       General: He is not in acute distress.     Appearance: He is not diaphoretic.   HENT:      Head: Atraumatic.   Eyes:      General: No scleral icterus.     Pupils: Pupils are equal, round, and reactive to light.   Cardiovascular:      Rate and Rhythm: Normal rate and regular rhythm.      Heart sounds: Normal heart sounds.   Pulmonary:      Effort: Pulmonary effort is normal. No respiratory distress.      Breath sounds: Normal breath sounds.   Abdominal:      General: Bowel sounds are normal.      Palpations: Abdomen is soft.      Tenderness: There is no abdominal tenderness.   Musculoskeletal:         General: No tenderness.      Right lower leg: No edema.      Left lower leg: No edema.   Skin:     General: Skin is warm.      Findings: No rash.      Comments: Do not note any vesicular, macular, papular lesions over anterior chest   Neurological:      Mental Status: He is alert.         ED Course                 Procedures              EKG Interpretation:      Interpreted by Carrol Gómez DO  Time reviewed: 1105  Symptoms at time of EKG: None  Rhythm: normal sinus   Rate: normal  Axis: normal  Ectopy: none  Conduction: normal  ST Segments/ T Waves: No ST-T wave changes  Q  Waves: none  Comparison to prior: Previously sinus tachycardia, improved appearance and EKG today    Clinical Impression: normal EKG          Critical Care time:  none               Results for orders placed or performed during the hospital encounter of 02/10/23 (from the past 24 hour(s))   CBC with platelets differential    Narrative    The following orders were created for panel order CBC with platelets differential.  Procedure                               Abnormality         Status                     ---------                               -----------         ------                     CBC with platelets and d...[[id number]]  Abnormal            Final result                 Please view results for these tests on the individual orders.   Basic metabolic panel   Result Value Ref Range    Sodium 133 (L) 136 - 145 mmol/L    Potassium 4.4 3.4 - 5.3 mmol/L    Chloride 97 (L) 98 - 107 mmol/L    Carbon Dioxide (CO2) 28 22 - 29 mmol/L    Anion Gap 8 7 - 15 mmol/L    Urea Nitrogen 14.8 8.0 - 23.0 mg/dL    Creatinine 1.16 0.67 - 1.17 mg/dL    Calcium 9.2 8.8 - 10.2 mg/dL    Glucose 94 70 - 99 mg/dL    GFR Estimate 69 >60 mL/min/1.73m2   Troponin T, High Sensitivity   Result Value Ref Range    Troponin T, High Sensitivity 25 (H) <=22 ng/L   CBC with platelets and differential   Result Value Ref Range    WBC Count 6.6 4.0 - 11.0 10e3/uL    RBC Count 4.86 4.40 - 5.90 10e6/uL    Hemoglobin 14.1 13.3 - 17.7 g/dL    Hematocrit 42.4 40.0 - 53.0 %    MCV 87 78 - 100 fL    MCH 29.0 26.5 - 33.0 pg    MCHC 33.3 31.5 - 36.5 g/dL    RDW 13.7 10.0 - 15.0 %    Platelet Count 139 (L) 150 - 450 10e3/uL    % Neutrophils 55 %    % Lymphocytes 31 %    % Monocytes 11 %    % Eosinophils 1 %    % Basophils 1 %    % Immature Granulocytes 1 %    NRBCs per 100 WBC 0 <1 /100    Absolute Neutrophils 3.7 1.6 - 8.3 10e3/uL    Absolute Lymphocytes 2.0 0.8 - 5.3 10e3/uL    Absolute Monocytes 0.7 0.0 - 1.3 10e3/uL    Absolute Eosinophils 0.0 0.0 - 0.7  10e3/uL    Absolute Basophils 0.0 0.0 - 0.2 10e3/uL    Absolute Immature Granulocytes 0.0 <=0.4 10e3/uL    Absolute NRBCs 0.0 10e3/uL   XR Chest 2 Views    Narrative    XR CHEST 2 VIEWS 2/10/2023 11:40 AM    HISTORY: Left sided chest pain    COMPARISON: 11/28/2022      Impression    IMPRESSION: No infiltrate, pleural effusion or pneumothorax. Stable  cardiac mediastinal silhouette. Old healed left lateral rib fractures.    BETTIE WONG MD         SYSTEM ID:  [id number]   UA with Microscopic reflex to Culture    Specimen: Urine, Midstream   Result Value Ref Range    Color Urine Yellow Colorless, Straw, Light Yellow, Yellow    Appearance Urine Clear Clear    Glucose Urine Negative Negative mg/dL    Bilirubin Urine Negative Negative    Ketones Urine Negative Negative mg/dL    Specific Gravity Urine 1.015 1.003 - 1.035    Blood Urine Negative Negative    pH Urine 6.0 5.0 - 7.0    Protein Albumin Urine Negative Negative mg/dL    Urobilinogen Urine Normal Normal, 2.0 mg/dL    Nitrite Urine Negative Negative    Leukocyte Esterase Urine Negative Negative    Mucus Urine Present (A) None Seen /LPF    RBC Urine 1 <=2 /HPF    WBC Urine 1 <=5 /HPF    Squamous Epithelials Urine <1 <=1 /HPF    Narrative    Urine Culture not indicated   Troponin T, High Sensitivity   Result Value Ref Range    Troponin T, High Sensitivity 23 (H) <=22 ng/L       Medications - No data to display    Assessments & Plan (with Medical Decision Making)  Jaswant is a 67-year-old male presenting to the emergency room with concerns of chest pain and rash.  See history and focused physical exam as above  Pleasant 67-year-old male in no acute distress, is hypertensive but otherwise vitally stable and afebrile.  Examined the chest and did not see any sign of rash consistent with shingles.  Patient currently denies any pain.  We will get an EKG, blood work, and chest x-ray for evaluation.  Labs and imaging as above.  He does have a slight elevation in his troponin  above the reference range, but it is not above 100, and he is not actively having chest pain.  We will check a second troponin in 2 hours to make sure does not elevate more than 7 points.  His EKG did not reveal any acute ischemic findings.  Second troponin came back, also slightly elevated but decreased from previous draw at 23.  Do not see any evidence of acute ischemia requiring this patient be hospitalized for further evaluation on an inpatient basis.  However, given age and other history, recommended he follow-up closely on an outpatient basis with his primary provider to determine his need for a stress test or other evaluation.  Since he does not have active shingles, do not see indication to treat with Valtrex, steroid, or other medication at this time.  Recommended he follow closely outpatient as needed and return promptly to the ER for any new or worsening symptoms develop.  All questions were answered and discharged in no distress     I have reviewed the nursing notes.    I have reviewed the findings, diagnosis, plan and need for follow up with the patient.       Medical Decision Making  The patient's presentation is strongly suggestive of an acute and uncomplicated illness or injury.    The patient's evaluation involved:  ordering and/or review of 3+ test(s) in this encounter (see separate area of note for details)    The patient's management involved only low risk treatment.        Discharge Medication List as of 2/10/2023  3:18 PM          Final diagnoses:   Atypical chest pain       2/10/2023   [facility] EMERGENCY DEPT     Carrol Gómez,   02/10/23 [id number]

## 2023-02-10 NOTE — DISCHARGE INSTRUCTIONS
Your EKG, blood work, and chest x-ray did not show any acute findings to explain your symptoms.  Your chest pain may be related to shingles    You should follow-up with your primary doctor in clinic, discuss if you should have a stress test or other evaluation for your chest pain.    Your urinary tract infection has cleared up.  If you continue to have symptoms, you should follow-up with your primary doctor for further evaluation    Continue all your medicines as previously prescribed, no changes were made today    If you develop any new or worsening symptoms, do not hesitate to return to the emergency room for evaluation

## 2023-02-10 NOTE — ED NOTES
Discussed repeat Troponin at 1315.  Patient reports recent UTI and was treated. He finished his antibiotics about a week ago. He states his sx have pretty much resolved but does have some pain at the end of his stream. We will collect urine.

## 2023-02-10 NOTE — ED TRIAGE NOTES
Patient c/o intermittent chest pain x 2 weeks. He had a rash to the area about a month ago and he thought he may have had shingles. He was not seen at that time.     Triage Assessment     Row Name 02/10/23 1035       Triage Assessment (Adult)    Airway WDL WDL       Respiratory WDL    Respiratory WDL WDL       Skin Circulation/Temperature WDL    Skin Circulation/Temperature WDL WDL

## 2025-01-28 ENCOUNTER — HOSPITAL ENCOUNTER (EMERGENCY)
Facility: HOSPITAL | Age: 70
Discharge: STILL ADMITTED OR ADMINISTRATIVE | End: 2025-01-29
Payer: MEDICARE